# Patient Record
Sex: MALE | Race: WHITE | NOT HISPANIC OR LATINO | URBAN - METROPOLITAN AREA
[De-identification: names, ages, dates, MRNs, and addresses within clinical notes are randomized per-mention and may not be internally consistent; named-entity substitution may affect disease eponyms.]

---

## 2020-10-23 ENCOUNTER — EMERGENCY (EMERGENCY)
Facility: HOSPITAL | Age: 60
LOS: 1 days | Discharge: HOME | End: 2020-10-23
Attending: EMERGENCY MEDICINE | Admitting: EMERGENCY MEDICINE
Payer: COMMERCIAL

## 2020-10-23 VITALS
WEIGHT: 164.91 LBS | TEMPERATURE: 98 F | OXYGEN SATURATION: 98 % | DIASTOLIC BLOOD PRESSURE: 96 MMHG | RESPIRATION RATE: 18 BRPM | HEIGHT: 71 IN | HEART RATE: 74 BPM | SYSTOLIC BLOOD PRESSURE: 135 MMHG

## 2020-10-23 DIAGNOSIS — S01.112A LACERATION WITHOUT FOREIGN BODY OF LEFT EYELID AND PERIOCULAR AREA, INITIAL ENCOUNTER: ICD-10-CM
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
## 2020-10-23 DIAGNOSIS — R55 SYNCOPE AND COLLAPSE: ICD-10-CM
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
## 2020-10-23 DIAGNOSIS — Z20.828 CONTACT WITH AND (SUSPECTED) EXPOSURE TO OTHER VIRAL COMMUNICABLE DISEASES: ICD-10-CM
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
## 2020-10-23 DIAGNOSIS — S01.81XA LACERATION WITHOUT FOREIGN BODY OF OTHER PART OF HEAD, INITIAL ENCOUNTER: ICD-10-CM

## 2020-10-23 LAB
ALBUMIN SERPL ELPH-MCNC: 3.6 G/DL — SIGNIFICANT CHANGE UP (ref 3.4–5)
ALP SERPL-CCNC: 39 U/L — LOW (ref 40–120)
ALT FLD-CCNC: 18 U/L — SIGNIFICANT CHANGE UP (ref 12–42)
ANION GAP SERPL CALC-SCNC: 9 MMOL/L — SIGNIFICANT CHANGE UP (ref 9–16)
APTT BLD: 31 SEC — SIGNIFICANT CHANGE UP (ref 27.5–35.5)
AST SERPL-CCNC: 18 U/L — SIGNIFICANT CHANGE UP (ref 15–37)
BASOPHILS # BLD AUTO: 0.03 K/UL — SIGNIFICANT CHANGE UP (ref 0–0.2)
BASOPHILS NFR BLD AUTO: 0.5 % — SIGNIFICANT CHANGE UP (ref 0–2)
BILIRUB SERPL-MCNC: 0.3 MG/DL — SIGNIFICANT CHANGE UP (ref 0.2–1.2)
BUN SERPL-MCNC: 34 MG/DL — HIGH (ref 7–23)
CALCIUM SERPL-MCNC: 8.7 MG/DL — SIGNIFICANT CHANGE UP (ref 8.5–10.5)
CHLORIDE SERPL-SCNC: 108 MMOL/L — SIGNIFICANT CHANGE UP (ref 96–108)
CO2 SERPL-SCNC: 24 MMOL/L — SIGNIFICANT CHANGE UP (ref 22–31)
CREAT SERPL-MCNC: 1.2 MG/DL — SIGNIFICANT CHANGE UP (ref 0.5–1.3)
EOSINOPHIL # BLD AUTO: 0.06 K/UL — SIGNIFICANT CHANGE UP (ref 0–0.5)
EOSINOPHIL NFR BLD AUTO: 1 % — SIGNIFICANT CHANGE UP (ref 0–6)
ETHANOL SERPL-MCNC: <3 MG/DL — SIGNIFICANT CHANGE UP
GLUCOSE SERPL-MCNC: 91 MG/DL — SIGNIFICANT CHANGE UP (ref 70–99)
HCT VFR BLD CALC: 37.4 % — LOW (ref 39–50)
HGB BLD-MCNC: 12.1 G/DL — LOW (ref 13–17)
IMM GRANULOCYTES NFR BLD AUTO: 0.2 % — SIGNIFICANT CHANGE UP (ref 0–1.5)
INR BLD: 1.02 — SIGNIFICANT CHANGE UP (ref 0.88–1.16)
LACTATE SERPL-SCNC: 0.6 MMOL/L — SIGNIFICANT CHANGE UP (ref 0.4–2)
LIDOCAIN IGE QN: 141 U/L — SIGNIFICANT CHANGE UP (ref 73–393)
LYMPHOCYTES # BLD AUTO: 1.54 K/UL — SIGNIFICANT CHANGE UP (ref 1–3.3)
LYMPHOCYTES # BLD AUTO: 25.4 % — SIGNIFICANT CHANGE UP (ref 13–44)
MAGNESIUM SERPL-MCNC: 2 MG/DL — SIGNIFICANT CHANGE UP (ref 1.6–2.6)
MCHC RBC-ENTMCNC: 29.4 PG — SIGNIFICANT CHANGE UP (ref 27–34)
MCHC RBC-ENTMCNC: 32.4 GM/DL — SIGNIFICANT CHANGE UP (ref 32–36)
MCV RBC AUTO: 91 FL — SIGNIFICANT CHANGE UP (ref 80–100)
MONOCYTES # BLD AUTO: 0.41 K/UL — SIGNIFICANT CHANGE UP (ref 0–0.9)
MONOCYTES NFR BLD AUTO: 6.8 % — SIGNIFICANT CHANGE UP (ref 2–14)
NEUTROPHILS # BLD AUTO: 4.01 K/UL — SIGNIFICANT CHANGE UP (ref 1.8–7.4)
NEUTROPHILS NFR BLD AUTO: 66.1 % — SIGNIFICANT CHANGE UP (ref 43–77)
NRBC # BLD: 0 /100 WBCS — SIGNIFICANT CHANGE UP (ref 0–0)
NT-PROBNP SERPL-SCNC: 12 PG/ML — SIGNIFICANT CHANGE UP
PLATELET # BLD AUTO: 166 K/UL — SIGNIFICANT CHANGE UP (ref 150–400)
POTASSIUM SERPL-MCNC: 4.1 MMOL/L — SIGNIFICANT CHANGE UP (ref 3.5–5.3)
POTASSIUM SERPL-SCNC: 4.1 MMOL/L — SIGNIFICANT CHANGE UP (ref 3.5–5.3)
PROT SERPL-MCNC: 6.7 G/DL — SIGNIFICANT CHANGE UP (ref 6.4–8.2)
PROTHROM AB SERPL-ACNC: 12 SEC — SIGNIFICANT CHANGE UP (ref 10.6–13.6)
RBC # BLD: 4.11 M/UL — LOW (ref 4.2–5.8)
RBC # FLD: 12.8 % — SIGNIFICANT CHANGE UP (ref 10.3–14.5)
SODIUM SERPL-SCNC: 141 MMOL/L — SIGNIFICANT CHANGE UP (ref 132–145)
TROPONIN I SERPL-MCNC: <0.017 NG/ML — LOW (ref 0.02–0.06)
WBC # BLD: 6.06 K/UL — SIGNIFICANT CHANGE UP (ref 3.8–10.5)
WBC # FLD AUTO: 6.06 K/UL — SIGNIFICANT CHANGE UP (ref 3.8–10.5)

## 2020-10-23 PROCEDURE — 70486 CT MAXILLOFACIAL W/O DYE: CPT | Mod: 26

## 2020-10-23 PROCEDURE — 93010 ELECTROCARDIOGRAM REPORT: CPT | Mod: 59

## 2020-10-23 PROCEDURE — 71046 X-RAY EXAM CHEST 2 VIEWS: CPT | Mod: 26

## 2020-10-23 PROCEDURE — 99285 EMERGENCY DEPT VISIT HI MDM: CPT

## 2020-10-23 RX ORDER — SODIUM CHLORIDE 9 MG/ML
1000 INJECTION INTRAMUSCULAR; INTRAVENOUS; SUBCUTANEOUS ONCE
Refills: 0 | Status: COMPLETED | OUTPATIENT
Start: 2020-10-23 | End: 2020-10-23

## 2020-10-23 RX ADMIN — SODIUM CHLORIDE 1000 MILLILITER(S): 9 INJECTION INTRAMUSCULAR; INTRAVENOUS; SUBCUTANEOUS at 23:53

## 2020-10-23 NOTE — ED PROVIDER NOTE - NSFOLLOWUPINSTRUCTIONS_ED_ALL_ED_FT
Laceration    A laceration is a cut that goes through all of the layers of the skin and into the tissue that is right under the skin. Some lacerations heal on their own. Others need to be closed with skin adhesive strips, skin glue, stitches (sutures), or staples. Proper laceration care minimizes the risk of infection and helps the laceration to heal better.  If non-absorbable stitches or staples have been placed, they must be taken out within the time frame instructed by your healthcare provider.    SEEK IMMEDIATE MEDICAL CARE IF YOU HAVE ANY OF THE FOLLOWING SYMPTOMS: swelling around the wound, worsening pain, drainage from the wound, red streaking going away from your wound, inability to move finger or toe near the laceration, or discoloration of skin near the laceration.    What is a concussion?  A concussion is a mild brain injury that can cause confusion, memory loss, and headache. Sometimes people pass out (lose consciousness) when they have a concussion, but not always.    A concussion can happen after a person has an injury to the head from being hit or falling.    What are the symptoms of a concussion?  Symptoms that can happen minutes to hours after a concussion include:    ?Memory loss – People sometimes forget what caused their injury, as well as what happened right before and after the injury.    ?Confusion    ?Headache    ?Dizziness or trouble with balance    ?Nausea or vomiting    ?Feeling sleepy    ?Acting cranky, strangely, or out of sorts    Symptoms that can happen hours to days after a concussion include:    ?Trouble walking or talking    ?Memory problems or problems paying attention    ?Trouble sleeping    ?Mood or behavior changes    ?Vision changes    ?Being bothered by noise or light    Will I need tests?  It depends on your injury and symptoms. To check if you have a concussion, your doctor will ask about your symptoms and do an exam. He or she will also ask you questions to check that you are thinking clearly.    If your doctor suspects a serious injury, he or she might order an imaging test of the brain, such as a CT or MRI scan. These tests create pictures of the skull and inside of the brain.    How is a concussion treated?  A concussion does not usually need treatment. Most concussions get better on their own, but it can take time. Some people's symptoms go away within minutes to hours. Other people have symptoms for weeks to months. When symptoms last a long time, doctors call it "postconcussion syndrome."    After your concussion, your doctor might recommend that someone watch you for 12 to 24 hours. This person should watch for symptoms.    To help your brain heal after a concussion, you can:    ?Rest your body – Make sure to get plenty of sleep. Avoid heavy exercise or too much physical activity if it makes you feel worse.    ?Rest your brain – Avoid doing activities that need concentration or a lot of attention if they make you feel worse.    ?Not drink alcohol while you are still having symptoms of concussion    ?Take a pain-relieving medicine, if you have a headache – You can choose one with acetaminophen (sample brand name: Tylenol) or ibuprofen (sample brand names: Advil, Motrin).    When can I play sports or do my usual activities again?  Ask your doctor when you can play sports or do your usual activities again. It will depend on your injury and symptoms, as well as the type of sport you play. Do not go back to playing on the same day as your injury.    It's important to let your brain heal completely after a concussion. Getting another concussion before your brain has healed may lead to serious brain problems.    When should I call the doctor or nurse?  Call the doctor or nurse if any of the following happen after a concussion:    ?You vomit more than 3 times    ?You have a severe headache, or a headache that gets worse    ?You have a seizure    ?You have trouble walking or talking    ?Your vision changes    ?You feel weak or numb in part of your body    ?You lose control over your bladder or bowel    If your doctor suggested that someone watch you after your concussion, this person should call the doctor or nurse if he or she:    ?Can't wake you up    ?Sees any of the symptoms listed above    How can I prevent another concussion?  To help prevent another concussion, you can:    ?Wear a helmet when you ride a bike or motorcycle, or play certain sports    ?Wear a seat belt when you drive or ride in a car    If you have one concussion, it's very important to try to prevent future concussions. Having many concussions might cause long-term brain damage and affect your thinking.

## 2020-10-23 NOTE — ED ADULT TRIAGE NOTE - CHIEF COMPLAINT QUOTE
Pt walks in c/o headache and laceration above L eye s/p syncopal episode with +LOC. Pt denies blood thinner use, blurred vision, N/V. Tetanus UTD.

## 2020-10-23 NOTE — ED ADULT NURSE NOTE - CHPI ED NUR SYMPTOMS NEG
no chills/no back pain/no fever/no dizziness/no nausea/no vomiting/no congestion/no diaphoresis/no chest pain

## 2020-10-23 NOTE — ED PROVIDER NOTE - OBJECTIVE STATEMENT
59 y/o male denies pmhx now here s/p presyncopal episode where patient was lying down in bed and stood up to walk to the kitchen and states he stood up quickly and fell down hitting his face. Patient states he did not loose consciousness and was awake the whole time but felt like he blacked out. Patient was able to stand up on his own with a quickly return to baseline. Has laceration above left eyebrow. Denies chest pain,nausea,vomiting,diarrhea,fevers,chills,sob,loc. patient appears well NAD stable.

## 2020-10-23 NOTE — ED PROVIDER NOTE - PATIENT PORTAL LINK FT
You can access the FollowMyHealth Patient Portal offered by [facility] by registering at the following website: [email or web address]. By joining [facility]’s FollowMyHealth portal, you will also be able to view your health information using other applications (apps) compatible with our system.

## 2020-10-23 NOTE — ED PROVIDER NOTE - CLINICAL SUMMARY MEDICAL DECISION MAKING FREE TEXT BOX
59 y/o male here with presyncopal episode with head lac above left eyebrow  labs,ct,cxr,ekg,  2 trops

## 2020-10-24 VITALS
RESPIRATION RATE: 18 BRPM | OXYGEN SATURATION: 98 % | HEART RATE: 64 BPM | TEMPERATURE: 98 F | DIASTOLIC BLOOD PRESSURE: 77 MMHG | SYSTOLIC BLOOD PRESSURE: 130 MMHG

## 2020-10-24 LAB
SARS-COV-2 IGG SERPL QL IA: NEGATIVE — SIGNIFICANT CHANGE UP
SARS-COV-2 IGM SERPL IA-ACNC: <0.1 INDEX — SIGNIFICANT CHANGE UP
SARS-COV-2 RNA SPEC QL NAA+PROBE: SIGNIFICANT CHANGE UP
TROPONIN I SERPL-MCNC: <0.017 NG/ML — LOW (ref 0.02–0.06)

## 2020-10-24 PROCEDURE — 70450 CT HEAD/BRAIN W/O DYE: CPT | Mod: 26

## 2020-10-24 NOTE — CONSULT NOTE ADULT - SUBJECTIVE AND OBJECTIVE BOX
60 year old male who had presyncopal episode and fell.  Patient hit floor wearing glasses.  Patient sustaining laceration to LT eyebrow and forehead.  Patient complains of pain, bleeding, deformity.    ED and nursing notes reviewed  Case D/W ED and nursing staff    ROS- negative  PMH- none  PSH- none  MEDS- none  NKA    PE-  system specific  LEFT eyebrow deep laceration  deep, through frontalis muscle  measures 4 cm  markedly macerated  moderate edema  minimal tenderness  no ecchymosis    forehead-  multiple 1 and 2 mm superficial lacerations

## 2020-10-24 NOTE — CONSULT NOTE ADULT - ASSESSMENT
IMP-  complex laceration LEFT eyebrow, 4 cm  simple laceration forehead, 2 mm    PLAN-  debridement with complex repair laceration LEFT eyebrow, 4 cm  simple repair laceration forehead, 2 mm      RTO 10 days

## 2020-10-24 NOTE — ED ADULT NURSE REASSESSMENT NOTE - NS ED NURSE REASSESS COMMENT FT1
Pt laying in bed, denies dizziness or lightheadedness at this time. Plastic surgeon currently suturing forehead lac. Troponin redrawn and sent down to lab. Will continue to monitor. Side rails up, safety is maintained.
